# Patient Record
Sex: FEMALE | Race: OTHER | Employment: OTHER | URBAN - METROPOLITAN AREA
[De-identification: names, ages, dates, MRNs, and addresses within clinical notes are randomized per-mention and may not be internally consistent; named-entity substitution may affect disease eponyms.]

---

## 2017-12-26 ENCOUNTER — APPOINTMENT (OUTPATIENT)
Dept: CT IMAGING | Age: 72
End: 2017-12-26
Payer: MEDICARE

## 2017-12-26 ENCOUNTER — HOSPITAL ENCOUNTER (EMERGENCY)
Age: 72
Discharge: NURSING, REHAB OR LONG-TERM CARE | End: 2017-12-26
Attending: EMERGENCY MEDICINE
Payer: MEDICARE

## 2017-12-26 VITALS
RESPIRATION RATE: 18 BRPM | OXYGEN SATURATION: 99 % | DIASTOLIC BLOOD PRESSURE: 60 MMHG | WEIGHT: 120 LBS | TEMPERATURE: 97.5 F | HEART RATE: 68 BPM | SYSTOLIC BLOOD PRESSURE: 122 MMHG

## 2017-12-26 DIAGNOSIS — S01.81XA FOREHEAD LACERATION, INITIAL ENCOUNTER: Primary | ICD-10-CM

## 2017-12-26 PROCEDURE — 12001 RPR S/N/AX/GEN/TRNK 2.5CM/<: CPT

## 2017-12-26 PROCEDURE — 99284 EMERGENCY DEPT VISIT MOD MDM: CPT

## 2017-12-26 PROCEDURE — 6370000000 HC RX 637 (ALT 250 FOR IP): Performed by: EMERGENCY MEDICINE

## 2017-12-26 PROCEDURE — 70450 CT HEAD/BRAIN W/O DYE: CPT

## 2017-12-26 PROCEDURE — 90471 IMMUNIZATION ADMIN: CPT | Performed by: EMERGENCY MEDICINE

## 2017-12-26 PROCEDURE — 90715 TDAP VACCINE 7 YRS/> IM: CPT | Performed by: EMERGENCY MEDICINE

## 2017-12-26 PROCEDURE — 6360000002 HC RX W HCPCS: Performed by: EMERGENCY MEDICINE

## 2017-12-26 RX ORDER — DONEPEZIL HYDROCHLORIDE 10 MG/1
10 TABLET, ORALLY DISINTEGRATING ORAL NIGHTLY
COMMUNITY

## 2017-12-26 RX ORDER — MORPHINE SULFATE 100 MG/5ML
10 SOLUTION ORAL
COMMUNITY

## 2017-12-26 RX ORDER — ACETAMINOPHEN 325 MG/1
650 TABLET ORAL ONCE
Status: COMPLETED | OUTPATIENT
Start: 2017-12-26 | End: 2017-12-26

## 2017-12-26 RX ORDER — ACETAMINOPHEN 325 MG/1
650 TABLET ORAL EVERY 6 HOURS PRN
COMMUNITY

## 2017-12-26 RX ORDER — ASPIRIN 81 MG/1
81 TABLET, CHEWABLE ORAL DAILY
COMMUNITY

## 2017-12-26 RX ORDER — PHENOL 1.4 %
1 AEROSOL, SPRAY (ML) MUCOUS MEMBRANE DAILY
COMMUNITY

## 2017-12-26 RX ORDER — LORAZEPAM 0.5 MG/1
0.5 TABLET ORAL EVERY 6 HOURS PRN
COMMUNITY

## 2017-12-26 RX ORDER — SENNA PLUS 8.6 MG/1
2 TABLET ORAL PRN
COMMUNITY

## 2017-12-26 RX ORDER — HYOSCYAMINE SULFATE 0.12 MG/ML
LIQUID ORAL EVERY 4 HOURS PRN
COMMUNITY

## 2017-12-26 RX ORDER — CITALOPRAM 20 MG/1
20 TABLET ORAL DAILY
COMMUNITY

## 2017-12-26 RX ORDER — ATORVASTATIN CALCIUM 10 MG/1
10 TABLET, FILM COATED ORAL DAILY
COMMUNITY

## 2017-12-26 RX ORDER — MEMANTINE HYDROCHLORIDE 5 MG/1
5 TABLET ORAL 2 TIMES DAILY
COMMUNITY

## 2017-12-26 RX ORDER — QUETIAPINE FUMARATE 50 MG/1
50 TABLET, FILM COATED ORAL 2 TIMES DAILY
COMMUNITY

## 2017-12-26 RX ORDER — CEPHALEXIN 500 MG/1
500 CAPSULE ORAL 4 TIMES DAILY
COMMUNITY

## 2017-12-26 RX ORDER — GINSENG 100 MG
CAPSULE ORAL ONCE
Status: COMPLETED | OUTPATIENT
Start: 2017-12-26 | End: 2017-12-26

## 2017-12-26 RX ADMIN — TETANUS TOXOID, REDUCED DIPHTHERIA TOXOID AND ACELLULAR PERTUSSIS VACCINE, ADSORBED 0.5 ML: 5; 2.5; 8; 8; 2.5 SUSPENSION INTRAMUSCULAR at 10:41

## 2017-12-26 RX ADMIN — ACETAMINOPHEN 650 MG: 325 TABLET ORAL at 11:04

## 2017-12-26 RX ADMIN — BACITRACIN: 500 OINTMENT TOPICAL at 10:41

## 2017-12-26 ASSESSMENT — PAIN SCALES - GENERAL: PAINLEVEL_OUTOF10: 0

## 2017-12-26 NOTE — ED NOTES
Antonio Cruz arrives to transport pt back to [facility].  Pt leaves facility stable     Lyla Velasquez RN  12/26/17 9881

## 2017-12-26 NOTE — ED PROVIDER NOTES
[facility] ED  [address] [place] [address]  Phone: [phone]  Fax: [phone]    Pt Name: Jesse Hays  MRN: [id number]  [place] 1945  Date of evaluation: 12/26/2017      CHIEF COMPLAINT       Chief Complaint   Patient presents with    Head Laceration         HISTORY OF PRESENT ILLNESS     Jesse Hays is a 74 y.o. female who presents From a local [facility] with a laceration to the forehead. The patient apparently fell and she does fall frequently but wasn't unwitnessed fall. She is in the dementia unit and has hospice. She is a DNR CC. Bleeding is controlled. There is no foreign bodies. Patient is able to verbalize but gives inappropriate answers. History is taken from the nursing home record. No reported loss of consciousness. No neurologic deficits. No neck or back injury. No chest or abdomen complaints. Denies any pain at this time. Brought in by ambulance. REVIEW OF SYSTEMS         REVIEW OF SYSTEMS    Unable to test due to mental status    PAST MEDICAL HISTORY    has a past medical history of Dementia; Dysphagia; Falling; and Hyperlipidemia. SURGICAL HISTORY      has no past surgical history on file.     CURRENT MEDICATIONS       Previous Medications    ACETAMINOPHEN (TYLENOL) 325 MG TABLET    Take 650 mg by mouth every 6 hours as needed for Pain    ASPIRIN 81 MG CHEWABLE TABLET    Take 81 mg by mouth daily    ATORVASTATIN (LIPITOR) 10 MG TABLET    Take 10 mg by mouth daily    BISACODYL (DULCOLAX PO)    Take by mouth    CALCIUM CARBONATE 600 MG TABS TABLET    Take 1 tablet by mouth daily    CEPHALEXIN (KEFLEX) 500 MG CAPSULE    Take 500 mg by mouth 4 times daily    CITALOPRAM (CELEXA) 20 MG TABLET    Take 20 mg by mouth daily    DONEPEZIL (ARICEPT ODT) 10 MG DISINTEGRATING TABLET    Take 10 mg by mouth nightly    ERGOCALCIFEROL (VITAMIN D2 PO)    Take by mouth    HYOSCYAMINE (LEVSIN) 125 MCG/ML SOLUTION    Take by mouth every 4 hours as range of motion, no edema. DIFFERENTIAL DIAGNOSIS/ MEDICAL DECISION MAKING:     The wound is repaired with sutures    Head injury instructions and wound care    Bacitracin dressing is placed    Tetanus is updated    Follow Exit Care instructions closely. I have reviewed the disposition diagnosis with the patient and or their family/guardian. I have answered their questions and given discharge instructions. They voiced understanding of these instructions and did not have any further questions or complaints. DIAGNOSTIC RESULTS     RADIOLOGY:   Non-plain film images such as CT, Ultrasound and MRI are read by the radiologist. Plain radiographic images are visualized and preliminarily interpreted by the emergency physician with the below findings:  CT Head WO Contrast   Final Result   1. Motion degrades images limiting evaluation. 2. No convincing acute intracranial abnormality. 3. Global parenchymal volume loss with chronic microvascular ischemic change. 4. Perhaps minimal right frontal scalp swelling. LABS:  No results found for this visit on 12/26/17. ABNORMAL LABS:  Labs Reviewed - No data to display     EKG: All EKG's are interpreted by the Emergency Department Physician who either signs or Co-signs this chart in the absence of a cardiologist.        EMERGENCY DEPARTMENT COURSE:   Vitals:    Vitals:    12/26/17 0955   BP: (!) 126/53   Pulse: 72   Resp: 18   Temp: 97.5 °F (36.4 °C)   TempSrc: Oral   SpO2: 100%   Weight: 54.4 kg (120 lb)     -------------------------  BP: (!) 126/53, Temp: 97.5 °F (36.4 °C), Pulse: 72, Resp: 18    See DDX/MDM (Differential Diagnosis/Medical Decision Making) above    Procedures: The wound was prepped with Betadine infiltrated with 3 mL 1% Xylocaine plain. Sterility maintained. Wound was explored to its base and no foreign bodies are found. There is no neurovascular injury. There is no tendon injury.   The wound is copiously irrigated and carefully debrided. The wound is sutured with 5-0 nylon. Bacitracin dressing is placed. Tolerated the procedure well. I performed the procedure          FINAL IMPRESSION      1. Forehead laceration, initial encounter          DISPOSITION/PLAN   DISPOSITION Decision To Discharge 12/26/2017 11:59:04 AM      Condition on Disposition    Fair    PATIENT REFERRED TO:  Lois Sainz MD  [address] [address]  [phone]    In 2 days        DISCHARGE MEDICATIONS:  New Prescriptions    No medications on file       (Please note that portions of this note were completed with a voice recognition program.  Efforts were made to edit the dictations but occasionally words are mis-transcribed.)    Hema Siddiqi,, DO  Attending Emergency Physician       [address] [facility], DO  12/26/17 [address] [facility], DO  12/26/17 4453

## 2019-09-30 ENCOUNTER — HOSPITAL ENCOUNTER (EMERGENCY)
Age: 74
Discharge: HOME | End: 2019-09-30
Attending: EMERGENCY MEDICINE
Payer: MEDICARE

## 2019-09-30 ENCOUNTER — APPOINTMENT (OUTPATIENT)
Dept: GENERAL RADIOLOGY | Age: 74
End: 2019-09-30
Payer: MEDICARE

## 2019-09-30 VITALS
TEMPERATURE: 99.7 F | DIASTOLIC BLOOD PRESSURE: 50 MMHG | SYSTOLIC BLOOD PRESSURE: 117 MMHG | WEIGHT: 121.4 LBS | BODY MASS INDEX: 21.51 KG/M2 | HEART RATE: 79 BPM | RESPIRATION RATE: 18 BRPM | OXYGEN SATURATION: 100 % | HEIGHT: 63 IN

## 2019-09-30 DIAGNOSIS — S50.11XA TRAUMATIC HEMATOMA OF RIGHT FOREARM, INITIAL ENCOUNTER: Primary | ICD-10-CM

## 2019-09-30 PROCEDURE — 73090 X-RAY EXAM OF FOREARM: CPT

## 2019-09-30 PROCEDURE — 99283 EMERGENCY DEPT VISIT LOW MDM: CPT
